# Patient Record
Sex: MALE | Race: WHITE | NOT HISPANIC OR LATINO
[De-identification: names, ages, dates, MRNs, and addresses within clinical notes are randomized per-mention and may not be internally consistent; named-entity substitution may affect disease eponyms.]

---

## 2017-08-25 ENCOUNTER — TRANSCRIPTION ENCOUNTER (OUTPATIENT)
Age: 47
End: 2017-08-25

## 2020-07-19 ENCOUNTER — TRANSCRIPTION ENCOUNTER (OUTPATIENT)
Age: 50
End: 2020-07-19

## 2021-05-13 ENCOUNTER — APPOINTMENT (OUTPATIENT)
Dept: RADIOLOGY | Facility: HOSPITAL | Age: 51
End: 2021-05-13
Payer: COMMERCIAL

## 2021-05-13 ENCOUNTER — APPOINTMENT (OUTPATIENT)
Dept: SPEECH THERAPY | Facility: HOSPITAL | Age: 51
End: 2021-05-13
Payer: COMMERCIAL

## 2021-05-13 ENCOUNTER — OUTPATIENT (OUTPATIENT)
Dept: OUTPATIENT SERVICES | Facility: HOSPITAL | Age: 51
LOS: 1 days | Discharge: HOME | End: 2021-05-13

## 2021-05-13 ENCOUNTER — OUTPATIENT (OUTPATIENT)
Dept: OUTPATIENT SERVICES | Facility: HOSPITAL | Age: 51
LOS: 1 days | End: 2021-05-13

## 2021-05-13 DIAGNOSIS — R13.12 DYSPHAGIA, OROPHARYNGEAL PHASE: ICD-10-CM

## 2021-05-13 PROCEDURE — 74230 X-RAY XM SWLNG FUNCJ C+: CPT | Mod: 26

## 2021-05-13 NOTE — HISTORY OF PRESENT ILLNESS
[FreeTextEntry1] : Patient arrived to Radiology for an OutPatient Modified Barium Swallow Study. Patient is a 52 y/o male with PMH: High Cholesterol; unremarkable neurological history as per patient report.  Patient offers c/o "for years, food get stuck and I choke; I am careful with eating."  Patient eats Regular Solids with Thin Liquids. Patient reports no c/o xerostomia. Patient reports no current/repeated pneumonia.  Patient reports that he has an appointment with a GI Physician.  This Modified Barium Swallow Study is to objectively assess the Physiology of the Oral and Pharyngeal Stage swallowing mechanism for treatment plan for least restrictive diet. \par \par Referring MD: Dr. Juan Leon  [phone]\par Dr. Eloy Zavala  GI Physician  [phone]\par

## 2021-05-13 NOTE — PLAN
[FreeTextEntry2] : \par 1.) Continue with current diet regimen of Regular and Thin Liquids\par 2.) Aspiration Precautions\par 3.) Reflux Precautions\par 4.) Maintain Good Oral Hygiene Care\par 5.) Follow up with Physicians\par \par SLP provided Patient education regarding preliminary results. Patient verbalized understanding and will follow up with Physicians. \par

## 2021-05-13 NOTE — ASSESSMENT
[FreeTextEntry1] : Patient presents with Functional Oral and Pharyngeal Stage swallowing mechanism. The Oral Stage is characterized by adequate oral containment, adequate chewing for solid, adequate bolus manipulation, adequate tongue motion with adequate anterior to posterior transfer of the bolus; intermittent piecemeal transfer/deglutition; with adequate oral clearance post swallow.  The Pharyngeal Stage is characterized by adequate initiation of the pharyngeal swallow, adequate laryngeal elevation, adequate tongue base retraction, and adequate pharyngeal constriction. There is adequate pharyngeal clearance post swallow for puree/solids/thin liquids.  No Aspiration observed before, during or after the swallow.   \par \par RESULTS:\par No Aspiration observed before, during or after the swallow. \par \par Of Note: Incidental Finding of an Osteophyte located at C5-C6 (as per Radiologist) nonobstructing for bolus passage. \par \par Of Note: An Esophageal Screen was performed. Patient was given a Barium Tablet with a cup of water. The Barium Tablet was observed to course through the pharynx/esophagus without hold up. This cannot be considered as a full complete evaluation of the esophagus. \par \par

## 2021-05-17 DIAGNOSIS — R13.10 DYSPHAGIA, UNSPECIFIED: ICD-10-CM
